# Patient Record
Sex: MALE | Race: WHITE | NOT HISPANIC OR LATINO | Employment: FULL TIME | URBAN - METROPOLITAN AREA
[De-identification: names, ages, dates, MRNs, and addresses within clinical notes are randomized per-mention and may not be internally consistent; named-entity substitution may affect disease eponyms.]

---

## 2023-03-08 ENCOUNTER — OFFICE VISIT (OUTPATIENT)
Dept: SURGERY | Facility: CLINIC | Age: 48
End: 2023-03-08
Payer: COMMERCIAL

## 2023-03-08 VITALS
WEIGHT: 184.8 LBS | BODY MASS INDEX: 24.49 KG/M2 | SYSTOLIC BLOOD PRESSURE: 116 MMHG | DIASTOLIC BLOOD PRESSURE: 76 MMHG | HEIGHT: 73 IN | OXYGEN SATURATION: 98 % | HEART RATE: 60 BPM

## 2023-03-08 DIAGNOSIS — K64.4 EXTERNAL HEMORRHOID: Primary | ICD-10-CM

## 2023-03-08 PROCEDURE — 99204 OFFICE O/P NEW MOD 45 MIN: CPT | Performed by: PHYSICIAN ASSISTANT

## 2023-03-08 PROCEDURE — 46600 DIAGNOSTIC ANOSCOPY SPX: CPT | Performed by: PHYSICIAN ASSISTANT

## 2023-03-08 RX ORDER — HYDROCORTISONE 25 MG/G
CREAM TOPICAL
Qty: 30 G | Refills: 1 | Status: SHIPPED | OUTPATIENT
Start: 2023-03-08

## 2023-03-08 NOTE — PROGRESS NOTES
"Aldair Coreas is a 48 y.o. male who is seen as a consult at the request of Self Referring for Rectal Bleeding.      HPI:  Pt presents today for evaluation of hemorrhoids.  First flare up was in 1997.   Did well without any issues until 3 years ago. Has had intermittent flare-ups since then that appear to occur more frequently.   During episodes, experiences perianal pain, swelling, and pruritis.   Occasional RB when hemorrhoids \"burst\".   Had internal prolapsing hemorrhoids years ago. Feels they are more external now.   Tried OTC suppositories, creams, and Witch Hazel without significant improvement.   Went to a hemorrhoid treatment center, but left prior to being seen as the visit was going to cost $700 out-of-pocket.     Regular BMs  2 BMs daily  Imperial 4 normally, but can vary  Tries to maintain a high fiber diet. Denies taking any fiber supplements.   No SS or laxatives.   No straining.     Does weight lift  300+ lbs regularly as part of his workout regimen. Denies a correlation between this activity and enlargement of the hemorrhoids.  Feels the hemorrhoids become more bothersome if he is sitting long periods of time.     No previous colonoscopy.   No known FHx of colon polyps,  colon cancer, or IBD.     History reviewed. No pertinent past medical history.    Past Surgical History:   Procedure Laterality Date   • INGUINAL HERNIA REPAIR Right 1977   • VARICOCELECTOMY N/A 1992       Social History:   reports that he has never smoked. He has never been exposed to tobacco smoke. He has never used smokeless tobacco. He reports current alcohol use. He reports that he does not use drugs.      Marriage status:     Family History   Problem Relation Age of Onset   • Cancer Father    • Prostate cancer Father          Current Outpatient Medications:   •  Hydrocortisone, Perianal, (Anusol-HC) 2.5 % rectal cream, Apply rectally 3 times daily.  Include applicator., Disp: 30 g, Rfl: 1    Allergy  Keflex " [cephalexin]    Review of Systems   Constitutional: Negative for decreased appetite and weight gain.   HENT: Negative for congestion, hearing loss and hoarse voice.    Eyes: Negative for blurred vision, discharge and visual disturbance.   Cardiovascular: Negative for chest pain, cyanosis and leg swelling.   Respiratory: Negative for cough, shortness of breath, sleep disturbances due to breathing and snoring.    Endocrine: Negative for cold intolerance and heat intolerance.   Hematologic/Lymphatic: Does not bruise/bleed easily.   Skin: Negative for itching, poor wound healing and skin cancer.   Musculoskeletal: Negative for arthritis, back pain, joint pain and joint swelling.   Gastrointestinal: Negative for abdominal pain, change in bowel habit, bowel incontinence and constipation.   Genitourinary: Negative for bladder incontinence, dysuria and hematuria.   Neurological: Negative for brief paralysis, excessive daytime sleepiness, dizziness, focal weakness, headaches, light-headedness and weakness.   Psychiatric/Behavioral: Negative for altered mental status and hallucinations. The patient does not have insomnia.    Allergic/Immunologic: Negative for HIV exposure and persistent infections.   All other systems reviewed and are negative.      Vitals:    03/08/23 1352   BP: 116/76   Pulse: 60   SpO2: 98%     Body mass index is 24.38 kg/m².    Physical Exam  Exam conducted with a chaperone present.   Constitutional:       General: He is not in acute distress.     Appearance: He is well-developed.   HENT:      Head: Normocephalic and atraumatic.      Nose: Nose normal.   Eyes:      Conjunctiva/sclera: Conjunctivae normal.      Pupils: Pupils are equal, round, and reactive to light.   Neck:      Trachea: No tracheal deviation.   Pulmonary:      Effort: Pulmonary effort is normal. No respiratory distress.      Breath sounds: Normal breath sounds.   Abdominal:      General: Bowel sounds are normal. There is no distension.       Palpations: Abdomen is soft.   Genitourinary:     Comments: Perianal exam: Left lateral external hemorrhoid mildly enlarged. Soft, non-thrombosed.   MANJIT- Good tone, no masses  Anoscopy performed:  Grade I x 3 internal hem   Musculoskeletal:         General: No deformity. Normal range of motion.      Cervical back: Normal range of motion.   Skin:     General: Skin is warm and dry.   Neurological:      Mental Status: He is alert and oriented to person, place, and time.      Cranial Nerves: No cranial nerve deficit.      Coordination: Coordination normal.      Gait: Gait normal.   Psychiatric:         Behavior: Behavior normal.         Judgment: Judgment normal.          Review of Medical Record:  I reviewed PMHx, Surgical Hx, FHx, and Current Medications    Assessment:  1. External hemorrhoid    - New    Plan:  - Discussed physical exam findings and common causes of hemorrhoid irritation and enlargement. Hemorrhoids are non-surgical at this time and recommended conservative treatment.   - Start Fiber. Recommended 30-40g Daily.   - SS PRN  - Rx for Anusol-HC 2.5% Rectal Cream Provided. Apply Internally and Externally TID x7-10 Days. Cautioned against chronic use.   - Due for Colonoscopy. Pt to call office when he is ready to schedule this.   - Follow up as needed for new or worsening symptoms.